# Patient Record
Sex: FEMALE | Race: WHITE
[De-identification: names, ages, dates, MRNs, and addresses within clinical notes are randomized per-mention and may not be internally consistent; named-entity substitution may affect disease eponyms.]

---

## 2021-04-28 ENCOUNTER — HOSPITAL ENCOUNTER (OUTPATIENT)
Dept: HOSPITAL 33 - SDC-PAIN | Age: 41
Discharge: HOME | End: 2021-04-28
Attending: PSYCHIATRY & NEUROLOGY
Payer: COMMERCIAL

## 2021-04-28 DIAGNOSIS — F41.8: ICD-10-CM

## 2021-04-28 DIAGNOSIS — I10: ICD-10-CM

## 2021-04-28 DIAGNOSIS — J45.909: ICD-10-CM

## 2021-04-28 DIAGNOSIS — M54.16: Primary | ICD-10-CM

## 2021-04-28 DIAGNOSIS — Z79.899: ICD-10-CM

## 2021-04-28 DIAGNOSIS — E11.9: ICD-10-CM

## 2021-04-28 PROCEDURE — 77003 FLUOROGUIDE FOR SPINE INJECT: CPT

## 2021-04-28 PROCEDURE — 84703 CHORIONIC GONADOTROPIN ASSAY: CPT

## 2021-04-28 PROCEDURE — 82947 ASSAY GLUCOSE BLOOD QUANT: CPT

## 2021-04-28 PROCEDURE — 72100 X-RAY EXAM L-S SPINE 2/3 VWS: CPT

## 2021-04-28 PROCEDURE — 62323 NJX INTERLAMINAR LMBR/SAC: CPT

## 2021-04-29 NOTE — XRAY
Indication: Lumbar SHEA.



Intraoperative fluoroscopy was provided for 16 seconds.  3 digital spot images

submitted for interpretation demonstrate the needle tip in the midline

projected posterior to the L4-L5 interspace.  A small amount of contrast has

been injected for needle tip placement.  Correlate with intraoperative

findings/report.

## 2021-06-16 ENCOUNTER — HOSPITAL ENCOUNTER (OUTPATIENT)
Dept: HOSPITAL 33 - SDC-PAIN | Age: 41
Discharge: HOME | End: 2021-06-16
Attending: PSYCHIATRY & NEUROLOGY
Payer: COMMERCIAL

## 2021-06-16 DIAGNOSIS — I10: ICD-10-CM

## 2021-06-16 DIAGNOSIS — Z79.899: ICD-10-CM

## 2021-06-16 DIAGNOSIS — F41.9: ICD-10-CM

## 2021-06-16 DIAGNOSIS — M54.16: Primary | ICD-10-CM

## 2021-06-16 DIAGNOSIS — J45.909: ICD-10-CM

## 2021-06-16 DIAGNOSIS — F32.9: ICD-10-CM

## 2021-06-16 PROCEDURE — 64484 NJX AA&/STRD TFRM EPI L/S EA: CPT

## 2021-06-16 PROCEDURE — 72100 X-RAY EXAM L-S SPINE 2/3 VWS: CPT

## 2021-06-16 PROCEDURE — 82947 ASSAY GLUCOSE BLOOD QUANT: CPT

## 2021-06-16 PROCEDURE — 64483 NJX AA&/STRD TFRM EPI L/S 1: CPT

## 2021-06-16 PROCEDURE — 77003 FLUOROGUIDE FOR SPINE INJECT: CPT

## 2021-06-16 PROCEDURE — 84703 CHORIONIC GONADOTROPIN ASSAY: CPT

## 2021-06-16 NOTE — XRAY
Indication: Right L4-S1 transforaminal SHEA.



Intraoperative fluoroscopy provided for 25 seconds.  3 digital spot image

submitted for interpretation demonstrates posterior needle tips projecting

over the expected right L4 and L5 nerve roots.  Small amount of contrast

injected for needle tip placement.  Correlate with intraoperative

findings/report.